# Patient Record
Sex: MALE | Race: ASIAN | NOT HISPANIC OR LATINO | URBAN - METROPOLITAN AREA
[De-identification: names, ages, dates, MRNs, and addresses within clinical notes are randomized per-mention and may not be internally consistent; named-entity substitution may affect disease eponyms.]

---

## 2018-08-29 ENCOUNTER — EMERGENCY (EMERGENCY)
Facility: HOSPITAL | Age: 53
LOS: 1 days | Discharge: HOME | End: 2018-08-29
Admitting: EMERGENCY MEDICINE
Payer: MEDICAID

## 2018-08-29 VITALS
TEMPERATURE: 98 F | HEART RATE: 88 BPM | RESPIRATION RATE: 18 BRPM | DIASTOLIC BLOOD PRESSURE: 75 MMHG | OXYGEN SATURATION: 100 % | SYSTOLIC BLOOD PRESSURE: 125 MMHG

## 2018-08-29 VITALS
OXYGEN SATURATION: 100 % | SYSTOLIC BLOOD PRESSURE: 133 MMHG | TEMPERATURE: 99 F | RESPIRATION RATE: 16 BRPM | HEART RATE: 55 BPM | DIASTOLIC BLOOD PRESSURE: 78 MMHG

## 2018-08-29 LAB
ALBUMIN SERPL ELPH-MCNC: 4.4 G/DL — SIGNIFICANT CHANGE UP (ref 3.3–5)
ALP SERPL-CCNC: 61 U/L — SIGNIFICANT CHANGE UP (ref 40–120)
ALT FLD-CCNC: 17 U/L — SIGNIFICANT CHANGE UP (ref 4–41)
AST SERPL-CCNC: 20 U/L — SIGNIFICANT CHANGE UP (ref 4–40)
BASOPHILS # BLD AUTO: 0.03 K/UL — SIGNIFICANT CHANGE UP (ref 0–0.2)
BASOPHILS NFR BLD AUTO: 0.4 % — SIGNIFICANT CHANGE UP (ref 0–2)
BILIRUB SERPL-MCNC: 0.3 MG/DL — SIGNIFICANT CHANGE UP (ref 0.2–1.2)
BUN SERPL-MCNC: 11 MG/DL — SIGNIFICANT CHANGE UP (ref 7–23)
CALCIUM SERPL-MCNC: 9.5 MG/DL — SIGNIFICANT CHANGE UP (ref 8.4–10.5)
CHLORIDE SERPL-SCNC: 97 MMOL/L — LOW (ref 98–107)
CO2 SERPL-SCNC: 24 MMOL/L — SIGNIFICANT CHANGE UP (ref 22–31)
CREAT SERPL-MCNC: 1.06 MG/DL — SIGNIFICANT CHANGE UP (ref 0.5–1.3)
EOSINOPHIL # BLD AUTO: 0.05 K/UL — SIGNIFICANT CHANGE UP (ref 0–0.5)
EOSINOPHIL NFR BLD AUTO: 0.7 % — SIGNIFICANT CHANGE UP (ref 0–6)
GLUCOSE SERPL-MCNC: 109 MG/DL — HIGH (ref 70–99)
HCT VFR BLD CALC: 43.2 % — SIGNIFICANT CHANGE UP (ref 39–50)
HGB BLD-MCNC: 13.6 G/DL — SIGNIFICANT CHANGE UP (ref 13–17)
IMM GRANULOCYTES # BLD AUTO: 0.04 # — SIGNIFICANT CHANGE UP
IMM GRANULOCYTES NFR BLD AUTO: 0.5 % — SIGNIFICANT CHANGE UP (ref 0–1.5)
LYMPHOCYTES # BLD AUTO: 1.2 K/UL — SIGNIFICANT CHANGE UP (ref 1–3.3)
LYMPHOCYTES # BLD AUTO: 15.7 % — SIGNIFICANT CHANGE UP (ref 13–44)
MCHC RBC-ENTMCNC: 26.5 PG — LOW (ref 27–34)
MCHC RBC-ENTMCNC: 31.5 % — LOW (ref 32–36)
MCV RBC AUTO: 84 FL — SIGNIFICANT CHANGE UP (ref 80–100)
MONOCYTES # BLD AUTO: 0.58 K/UL — SIGNIFICANT CHANGE UP (ref 0–0.9)
MONOCYTES NFR BLD AUTO: 7.6 % — SIGNIFICANT CHANGE UP (ref 2–14)
NEUTROPHILS # BLD AUTO: 5.75 K/UL — SIGNIFICANT CHANGE UP (ref 1.8–7.4)
NEUTROPHILS NFR BLD AUTO: 75.1 % — SIGNIFICANT CHANGE UP (ref 43–77)
NRBC # FLD: 0 — SIGNIFICANT CHANGE UP
PLATELET # BLD AUTO: 270 K/UL — SIGNIFICANT CHANGE UP (ref 150–400)
PMV BLD: 9.8 FL — SIGNIFICANT CHANGE UP (ref 7–13)
POTASSIUM SERPL-MCNC: 4.5 MMOL/L — SIGNIFICANT CHANGE UP (ref 3.5–5.3)
POTASSIUM SERPL-SCNC: 4.5 MMOL/L — SIGNIFICANT CHANGE UP (ref 3.5–5.3)
PROT SERPL-MCNC: 7.4 G/DL — SIGNIFICANT CHANGE UP (ref 6–8.3)
RBC # BLD: 5.14 M/UL — SIGNIFICANT CHANGE UP (ref 4.2–5.8)
RBC # FLD: 12.7 % — SIGNIFICANT CHANGE UP (ref 10.3–14.5)
SODIUM SERPL-SCNC: 134 MMOL/L — LOW (ref 135–145)
TROPONIN T, HIGH SENSITIVITY: 6 NG/L — SIGNIFICANT CHANGE UP (ref ?–14)
WBC # BLD: 7.65 K/UL — SIGNIFICANT CHANGE UP (ref 3.8–10.5)
WBC # FLD AUTO: 7.65 K/UL — SIGNIFICANT CHANGE UP (ref 3.8–10.5)

## 2018-08-29 PROCEDURE — 99285 EMERGENCY DEPT VISIT HI MDM: CPT

## 2018-08-29 PROCEDURE — 70450 CT HEAD/BRAIN W/O DYE: CPT | Mod: 26

## 2018-08-29 PROCEDURE — 71046 X-RAY EXAM CHEST 2 VIEWS: CPT | Mod: 26

## 2018-08-29 RX ORDER — SODIUM CHLORIDE 9 MG/ML
1000 INJECTION INTRAMUSCULAR; INTRAVENOUS; SUBCUTANEOUS ONCE
Qty: 0 | Refills: 0 | Status: COMPLETED | OUTPATIENT
Start: 2018-08-29 | End: 2018-08-29

## 2018-08-29 RX ORDER — ASPIRIN/CALCIUM CARB/MAGNESIUM 324 MG
324 TABLET ORAL ONCE
Qty: 0 | Refills: 0 | Status: COMPLETED | OUTPATIENT
Start: 2018-08-29 | End: 2018-08-29

## 2018-08-29 RX ORDER — MECLIZINE HCL 12.5 MG
25 TABLET ORAL ONCE
Qty: 0 | Refills: 0 | Status: COMPLETED | OUTPATIENT
Start: 2018-08-29 | End: 2018-08-29

## 2018-08-29 RX ORDER — MECLIZINE HCL 12.5 MG
1 TABLET ORAL
Qty: 20 | Refills: 0 | OUTPATIENT
Start: 2018-08-29 | End: 2018-09-07

## 2018-08-29 RX ADMIN — SODIUM CHLORIDE 2000 MILLILITER(S): 9 INJECTION INTRAMUSCULAR; INTRAVENOUS; SUBCUTANEOUS at 11:59

## 2018-08-29 RX ADMIN — SODIUM CHLORIDE 1000 MILLILITER(S): 9 INJECTION INTRAMUSCULAR; INTRAVENOUS; SUBCUTANEOUS at 13:57

## 2018-08-29 RX ADMIN — Medication 324 MILLIGRAM(S): at 11:59

## 2018-08-29 RX ADMIN — Medication 25 MILLIGRAM(S): at 13:57

## 2018-08-29 NOTE — ED PROVIDER NOTE - PROGRESS NOTE DETAILS
I called and spoke with the patient's PMD Dr. Grimes ([phone]) who stated the patient is misunderstood, he was not instructed to discontinue his daily regimen of medications. Dr. Grimes advised please evaluate and treat patient if needed; otherwise, if no acute pathology found today, please have the patient return to Dr. Grimes's office tomorrow and he will rectify the misunderstanding of the patient's daily meds. I advised patient that he should take his meds as prescribed by Dr. Grimes and to follow up tomorrow with his PMD. Pt is currently pending work-up in our ED, will follow results of blood work and imaging. Patient given Meclizine in ED, feeling better no longer dizzy. Patient's VSS in NAD,  Patient talking in full sentences, able to ambulate well in the emergency room with no assistance. Able tolerate by mouth fluids. Patient is stable to be discharged. Patient can be discharged with outpatient follow-up with PMD and Cardiology (pt see's Dr. NAYE Grimes). Patient expresses understanding of the diagnosis and has been told to return to the emergency room if any fevers, chills, chest pain, shortness of breath, new or worsening symptoms. Patient expresses desire for discharge and agrees to follow-up as discussed.

## 2018-08-29 NOTE — ED ADULT TRIAGE NOTE - CHIEF COMPLAINT QUOTE
states" I feel dizzy since about 1 week with some head ache" patient also c/o general body pain on and off "

## 2018-08-29 NOTE — ED PROVIDER NOTE - OBJECTIVE STATEMENT
52yo M w/ states" I feel dizzy since about 1 week with some head ache" patient also c/o general body pain on and off "  dizziness  Dr. Shaikh Grimes [phone] cell, [phone] 52yo M w/ states" I feel dizzy since about 1 week with some head ache" patient also c/o general body pain on and off " Patient states he was seen last Thursday 8/23 at his PMD's office and was  dizziness  Dr. Shaikh Grimes [phone] [facility], [phone] 52yo M w/ pmhx of HTN, HLD, GERD and fibromyalgia presents to the ED c/o dizziness, headaches and myalgias x ~1 week. Pt reports mild frontal HA which came on gradually in onset, mild which increases throughout the day. Not worse HA of life, no blurred vision, syncope. No hx of aneurysm, no family hx of aneurysm. Pt describes dizziness as dysequilibrium with changes in his normal balance. Pt denies similar symptoms in the past. Pt was seen at his PMD's office last Thursday 8/23 at his PMD's office with same complaint. pt was told to discontinue his medications by PMD. Pt reports total body nerve pain described as pinching pain everywhere. Denies fevers, chills, chest pain presently, sob, cough, abd pain, N/V/D, recent travel, recent abx use, sick contacts, rhinorrhea, tinnitus, hearing loss.  Dr. Shaikh Grimes [phone] cell, [phone]

## 2018-08-29 NOTE — ED PROVIDER NOTE - MEDICAL DECISION MAKING DETAILS
A:   -54yo M presents w/ HA and dizziness, myalgias- likely nerve pain  P:  -Labs, xray chest, CT head, EKG, ASA 324mgs, Meclizine 25mgs PO. F/u with PMD

## 2019-05-03 NOTE — ED PROVIDER NOTE - NEUROLOGICAL GAIT WEIGHT BEARING
"    5/3/2019         RE: Gaurang Rivera  [address]  [facility] [address]        Dear Colleague,    Thank you for referring your patient, Gaurang Rivera, to the [facility] ORTHOPED[facility] SURGERY. Please see a copy of my visit note below.    CHIEF CONCERN:   Right shoulder pain     HISTORY OF PRESENT ILLNESS:  Mr. Rivera is a 50 year old RHD male who presents after a fall 2 /28/19.  Patient reports he fell in the parking lot on a bent elbow jarring the arm upwards into the shoulder.  Patient noted he had extreme pain in the arm initially but he thought it was a bad sprain and would get better. Now the problem is that it hurts and says \"I can't move my arm much.\" He has had no formal treatment.  Patient reports superior and lateral shoulder pain that radiates into the the lateral upper arm. Patient notes minimal pain at rest, with extending the arm and with general use he reports an aching pain. The pain will slowly dissipate.  Patient reports increased pain with showering, and reaching above chest height in all directions. He notes difficulties dressing.  Limited range of motion in all planes. Patient denies weakness of the shoulder, but is limited due to pain.  Patient denies catching, grinding, and no numbness and tingling.  He is able to sleep OK. Current pain level: 3/10, highest pain level: 9/10 with use of the arm.    Treatments tried: OTC ibuprofen 400mg TID, icing as needed.    Last documented A1C of 6.5 on 9/26/18.      Past Medical History:   Diagnosis Date     Depressive disorder 2001     Diabetes (H)      Hypercholesteremia 2012       Past Surgical History:   Procedure Laterality Date     BACK SURGERY  1997    spinal fusion     CHOLECYSTECTOMY  2012       Current Outpatient Medications   Medication Sig Dispense Refill     blood glucose (NO BRAND SPECIFIED) lancets standard Use to test blood sugar one times daily or as directed. 100 each 11     blood glucose monitoring (NO BRAND " SPECIFIED) meter device kit Use to test blood sugar one times daily or as directed. 1 kit 3     blood glucose monitoring (NO BRAND SPECIFIED) test strip Use to test blood sugars one times daily or as directed 100 strip 1     cephALEXin (KEFLEX) 500 MG capsule Take 1 capsule (500 mg) by mouth 4 times daily 40 capsule 0     IBUPROFEN PO        lisinopril (PRINIVIL/ZESTRIL) 5 MG tablet Take 1 tablet (5 mg) by mouth daily 90 tablet 1     metFORMIN (GLUCOPHAGE) 500 MG tablet Take 2 tablets (1,000 mg) by mouth 2 times daily (with meals) 120 tablet 2     methylphenidate (RITALIN) 10 MG tablet Take 1 tablet (10 mg) by mouth 2 times daily 60 tablet 0     multivitamin, therapeutic with minerals (MULTI-VITAMIN) TABS tablet Take 1 tablet by mouth daily       simvastatin (ZOCOR) 40 MG tablet Take 1 tablet (40 mg) by mouth At Bedtime 90 tablet 3     venlafaxine (EFFEXOR-XR) 150 MG 24 hr capsule Take 2 capsules (300 mg) by mouth daily 180 capsule 0        No Known Allergies    SOCIAL HISTORY:    Social History     Socioeconomic History     Marital status: Single     Spouse name: Not on file     Number of children: Not on file     Years of education: Not on file     Highest education level: Not on file   Occupational History     Not on file   Social Needs     Financial resource strain: Not on file     Food insecurity:     Worry: Not on file     Inability: Not on file     Transportation needs:     Medical: Not on file     Non-medical: Not on file   Tobacco Use     Smoking status: Never Smoker     Smokeless tobacco: Never Used   Substance and Sexual Activity     Alcohol use: No     Comment: None     Drug use: No     Sexual activity: Not Currently     Partners: Female   Lifestyle     Physical activity:     Days per week: Not on file     Minutes per session: Not on file     Stress: Not on file   Relationships     Social connections:     Talks on phone: Not on file     Gets together: Not on file     Attends Yarsani service: Not on  file     Active member of club or organization: Not on file     Attends meetings of clubs or organizations: Not on file     Relationship status: Not on file     Intimate partner violence:     Fear of current or ex partner: Not on file     Emotionally abused: Not on file     Physically abused: Not on file     Forced sexual activity: Not on file   Other Topics Concern     Parent/sibling w/ CABG, MI or angioplasty before 65F 55M? Not Asked   Social History Narrative     Not on file       FAMILY HISTORY: Reviewed in EMR      REVIEW OF SYSTEMS: Positive for that noted in past medical history and history of present illness and otherwise reviewed in EMR     PHYSICAL EXAM:    Adult male in no acute distress. Articulates and communicates with normal affect.  Respirations even and unlabored  Focused upper extremity exam: Skin intact. No erythema. Sensation intact all dermatomes into the hand to light touch. EPL, FPL, and Intrinsics intact. Right shoulder active motion is FE to 90, ER at side to 15, and IR to buttock. Left shoulder active motion is FE to 180, ER to 80, and IR to T12.  On the right limited ability to do Neer and George. No pain on palpation over the AC joint. Mild pain on palpation over the long head of the biceps. FE and ER strength 4+/5 and limited by pain    IMAGING:  Right shoulder initial xrays demonstrate loss of normal cortical contour of the inferior glenoid rim, suggestive of bony bankart/glenoid fracture     Right shoulder CT demonstrates:  Impression:  1.  Fracture of the anterior/inferior glenoid with bony union. The fracture results in deformity of the bony glenoid as noted above.  2.  Bony Hill-Sachs lesion.  3.  Incidental small bony lesion in the right third rib. Follow up is recommended in 3-6 months with CT to assess stability.       ASSESSMENT:    1. 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 Right shoulder stiffness  3. Right rib incidental bony lesion    PLAN:  I  discussed the imaging and exam with the patient and I outlined that his bony Bankart fragment, although displaced, has healed.  The malunion is such that I would not recommend aggressive surgical intervention at this time.  He has not yet had any trial of any formal treatment.  I would initiate physical therapy to address his significant shoulder stiffness at present.  If his motion does not improve or he has lasting symptoms we can consider surgical options.  He is in agreement with the physical therapy at this time and a referral has been placed to [facility] [place] [facility]  He will follow-up with me in 6 weeks [place] [facility]    Dulce Maria Burdick MD      Again, thank you for allowing me to participate in the care of your patient.        Sincerely,        Dulce Maria Burdick MD     normal